# Patient Record
Sex: MALE | Race: WHITE | NOT HISPANIC OR LATINO
[De-identification: names, ages, dates, MRNs, and addresses within clinical notes are randomized per-mention and may not be internally consistent; named-entity substitution may affect disease eponyms.]

---

## 2017-01-24 ENCOUNTER — TRANSCRIPTION ENCOUNTER (OUTPATIENT)
Age: 27
End: 2017-01-24

## 2022-09-28 ENCOUNTER — APPOINTMENT (OUTPATIENT)
Dept: UROLOGY | Facility: CLINIC | Age: 32
End: 2022-09-28

## 2022-09-28 VITALS
DIASTOLIC BLOOD PRESSURE: 81 MMHG | OXYGEN SATURATION: 97 % | HEIGHT: 71 IN | BODY MASS INDEX: 24.08 KG/M2 | SYSTOLIC BLOOD PRESSURE: 143 MMHG | HEART RATE: 100 BPM | WEIGHT: 172 LBS

## 2022-09-28 DIAGNOSIS — Z30.09 ENCOUNTER FOR OTHER GENERAL COUNSELING AND ADVICE ON CONTRACEPTION: ICD-10-CM

## 2022-09-28 PROCEDURE — 99204 OFFICE O/P NEW MOD 45 MIN: CPT

## 2022-09-28 RX ORDER — DIAZEPAM 5 MG/1
5 TABLET ORAL
Qty: 1 | Refills: 0 | Status: ACTIVE | COMMUNITY
Start: 2022-09-28 | End: 1900-01-01

## 2022-09-28 NOTE — HISTORY OF PRESENT ILLNESS
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 A post operative semen analysis meeting AUA guidelines criteria is mandatory before the surgery is deemed successful. Patient and spouse should continue to use alternative contraception methods until a post operative semen analysis has demonstrated non-motile sperm meeting AUA guidelines criteria. Printed guide reiterating above and more, plus post operation instructions provided to patient.

## 2022-09-28 NOTE — PHYSICAL EXAM
[General Appearance - Well Developed] : well developed [General Appearance - Well Nourished] : well nourished [Normal Appearance] : normal appearance [Well Groomed] : well groomed [General Appearance - In No Acute Distress] : no acute distress [Edema] : no peripheral edema [] : no respiratory distress [Respiration, Rhythm And Depth] : normal respiratory rhythm and effort [Exaggerated Use Of Accessory Muscles For Inspiration] : no accessory muscle use [FreeTextEntry1] : both Vas palpated easily [Normal Station and Gait] : the gait and station were normal for the patient's age [Oriented To Time, Place, And Person] : oriented to person, place, and time [Affect] : the affect was normal [Mood] : the mood was normal [Not Anxious] : not anxious

## 2022-11-02 ENCOUNTER — APPOINTMENT (OUTPATIENT)
Dept: UROLOGY | Facility: CLINIC | Age: 32
End: 2022-11-02

## 2022-12-21 ENCOUNTER — TRANSCRIPTION ENCOUNTER (OUTPATIENT)
Age: 32
End: 2022-12-21

## 2023-07-10 ENCOUNTER — NON-APPOINTMENT (OUTPATIENT)
Age: 33
End: 2023-07-10